# Patient Record
Sex: FEMALE | Race: WHITE | NOT HISPANIC OR LATINO | URBAN - METROPOLITAN AREA
[De-identification: names, ages, dates, MRNs, and addresses within clinical notes are randomized per-mention and may not be internally consistent; named-entity substitution may affect disease eponyms.]

---

## 2024-06-18 ENCOUNTER — APPOINTMENT (OUTPATIENT)
Dept: OBSTETRICS AND GYNECOLOGY | Facility: CLINIC | Age: 74
End: 2024-06-18
Payer: MEDICARE

## 2024-06-18 PROBLEM — M25.521 ELBOW PAIN, RIGHT: Status: RESOLVED | Noted: 2024-06-18 | Resolved: 2024-06-18

## 2024-06-18 PROBLEM — M77.11 LATERAL EPICONDYLITIS OF RIGHT ELBOW: Status: RESOLVED | Noted: 2024-06-18 | Resolved: 2024-06-18

## 2024-06-18 PROBLEM — M81.0 OSTEOPOROSIS: Status: ACTIVE | Noted: 2024-06-18

## 2024-06-18 PROBLEM — F41.9 ANXIETY: Status: ACTIVE | Noted: 2022-01-03

## 2024-06-18 PROBLEM — E78.5 HYPERLIPIDEMIA: Status: ACTIVE | Noted: 2024-06-18

## 2024-06-18 PROBLEM — E05.00 GRAVES DISEASE: Status: ACTIVE | Noted: 2024-06-18

## 2024-06-18 PROBLEM — I48.91 ATRIAL FIBRILLATION, CONTROLLED (MULTI): Status: ACTIVE | Noted: 2024-06-18

## 2024-06-18 PROBLEM — E87.6 HYPOKALEMIA: Status: ACTIVE | Noted: 2024-02-21

## 2024-06-19 ENCOUNTER — APPOINTMENT (OUTPATIENT)
Dept: OBSTETRICS AND GYNECOLOGY | Facility: CLINIC | Age: 74
End: 2024-06-19
Payer: MEDICARE

## 2024-06-19 VITALS
DIASTOLIC BLOOD PRESSURE: 60 MMHG | WEIGHT: 134 LBS | BODY MASS INDEX: 21.53 KG/M2 | HEIGHT: 66 IN | SYSTOLIC BLOOD PRESSURE: 94 MMHG

## 2024-06-19 DIAGNOSIS — N95.2 VAGINAL ATROPHY: ICD-10-CM

## 2024-06-19 DIAGNOSIS — Z01.419 WELL WOMAN EXAM WITH ROUTINE GYNECOLOGICAL EXAM: Primary | ICD-10-CM

## 2024-06-19 PROBLEM — M31.6 GIANT CELL ARTERITIS (MULTI): Status: ACTIVE | Noted: 2024-02-22

## 2024-06-19 PROBLEM — K21.00 GASTROESOPHAGEAL REFLUX DISEASE WITH ESOPHAGITIS WITHOUT HEMORRHAGE: Status: RESOLVED | Noted: 2021-11-02 | Resolved: 2024-06-19

## 2024-06-19 PROBLEM — F33.8 SEASONAL AFFECTIVE DISORDER (CMS-HCC): Status: ACTIVE | Noted: 2024-02-19

## 2024-06-19 PROCEDURE — 1036F TOBACCO NON-USER: CPT | Performed by: OBSTETRICS & GYNECOLOGY

## 2024-06-19 PROCEDURE — G0101 CA SCREEN;PELVIC/BREAST EXAM: HCPCS | Performed by: OBSTETRICS & GYNECOLOGY

## 2024-06-19 PROCEDURE — 88175 CYTOPATH C/V AUTO FLUID REDO: CPT

## 2024-06-19 RX ORDER — ATORVASTATIN CALCIUM 20 MG/1
20 TABLET, FILM COATED ORAL
COMMUNITY
Start: 2022-06-16

## 2024-06-19 RX ORDER — ESTRADIOL 0.1 MG/G
CREAM VAGINAL
COMMUNITY
Start: 2020-01-14 | End: 2024-06-19 | Stop reason: SDUPTHER

## 2024-06-19 RX ORDER — RIVAROXABAN 20 MG/1
TABLET, FILM COATED ORAL
COMMUNITY

## 2024-06-19 RX ORDER — SERTRALINE HYDROCHLORIDE 25 MG/1
25 TABLET, FILM COATED ORAL DAILY
COMMUNITY

## 2024-06-19 RX ORDER — ESTRADIOL 0.1 MG/G
1 CREAM VAGINAL 3 TIMES WEEKLY
Qty: 42.5 G | Refills: 2 | Status: SHIPPED | OUTPATIENT
Start: 2024-06-19

## 2024-06-19 RX ORDER — SULFAMETHOXAZOLE AND TRIMETHOPRIM 800; 160 MG/1; MG/1
1 TABLET ORAL
COMMUNITY
Start: 2024-03-01 | End: 2024-08-28

## 2024-06-19 RX ORDER — ERGOCALCIFEROL 1.25 MG/1
CAPSULE ORAL
COMMUNITY
Start: 2022-06-16

## 2024-06-19 RX ORDER — PREDNISONE 20 MG/1
3 TABLET ORAL
COMMUNITY
Start: 2024-03-11

## 2024-06-19 RX ORDER — POTASSIUM CHLORIDE 750 MG/1
10 TABLET, EXTENDED RELEASE ORAL
COMMUNITY
Start: 2024-06-04 | End: 2025-06-04

## 2024-06-19 RX ORDER — OMEPRAZOLE 40 MG/1
40 CAPSULE, DELAYED RELEASE ORAL DAILY
COMMUNITY

## 2024-06-19 NOTE — PROGRESS NOTES
Chief Complaint   Patient presents with    Annual Exam     Annual Exam with Pap Smear       Stef-Scheduled through PCP    No complaints.  Recently diagnosed with GCA in Feb.     Chaperone declined.     GCA - Giant cell arteritis (temporal arteritis)  Had cholecystectomy    In the past year, underwent cholecystectomy.  Prior to that had chronic right-sided pain which has improved.  Also diagnosed with temporal arteritis.  Seeing rheumatology on an ongoing basis.    REVIEW OF SYSTEMS    Please see HPI for reported pertinent positives, which would supersede this ROS    Constitutional:  Denies fever, chills, wt gain or loss, fatigue    Genito-Urinary:  Denies genital lesion or sores, vaginal dryness, itching  or pain.  No abnormal vaginal discharge or unexplained vaginal bleeding.  No dysuria, urinary incontinence or frequency.  Denies pelvic pain, dysmenorrhea or dyspareunia.    Eyes:  Denies vision changes, dryness  ENT:  No hearing loss, sinus pain or congestion, nosebleeds  Cardiovascular:  No chest pain or palpitations  Respiratory:  No SOB, cough, wheezing  GI:  No Nausea, vomiting, diarrhea, constipation, abdominal pain  Musculoskeletal:  No new back pain. joint pain, peripheral edema  Skin:  No rash or skin lesion  Neurologic:  No HA, numbness or dizziness  Psychiatric:  No new anxiety or depression  Endocrine:  No loss of hair or hirsutism  Hematologic/lymphatic:  No swollen lymph nodes.  No reported tendency for easy bruising or bleeding    Patient admits to no other systemic complaints      Vitals:    24 0937   BP: 94/60       PHYSICAL EXAM:    PSYCH:  Pt is alert, oriented and cooperative    SKIN: warm, dry, w/o lesion    HEAD AND FACE:  External inspection of eyes, ears, functional cranial nerves normal and intact    THYROID:  No thyromegaly    CARDIOVASCULAR:  Warm and well Perfused    PULMONARY:  No respiratory distress    ABDOMEN:  soft, nontender.  No mass or organomegaly.      BREAST:  Breasts  are symmetric to inspection and palpation.  No mass palpable bilaterally.  There is no axillary lymphadenopathy    PELVIC:    External genitalia, urethra, perianal region normal to inspection and palpation if indicated.  No inguinal LA    Vagina without lesions.    Cervix seen and visually normal      Bimanual exam:      No pelvic mass palpable    Uterus nontender, midline in pelvis    No adnexal masses or tenderness    Assessment/Plan    Diagnoses and all orders for this visit:  Well woman exam with routine gynecological exam  -     THINPREP PAP TEST  Vaginal atrophy  -     estradiol (Estrace) 0.01 % (0.1 mg/gram) vaginal cream; Insert 0.25 Applicatorfuls (1 g) into the vagina 3 times a week.

## 2024-07-01 LAB
CYTOLOGY CMNT CVX/VAG CYTO-IMP: NORMAL
LAB AP HPV GENOTYPE QUESTION: YES
LAB AP HPV HR: NORMAL
LABORATORY COMMENT REPORT: NORMAL
MENSTRUAL HX REPORTED: NORMAL
PATH REPORT.TOTAL CANCER: NORMAL

## 2025-04-04 DIAGNOSIS — N95.2 VAGINAL ATROPHY: ICD-10-CM

## 2025-04-04 RX ORDER — ESTRADIOL 0.1 MG/G
CREAM VAGINAL
Qty: 42.5 G | Refills: 2 | Status: SHIPPED | OUTPATIENT
Start: 2025-04-04